# Patient Record
Sex: FEMALE | Race: WHITE | NOT HISPANIC OR LATINO | URBAN - METROPOLITAN AREA
[De-identification: names, ages, dates, MRNs, and addresses within clinical notes are randomized per-mention and may not be internally consistent; named-entity substitution may affect disease eponyms.]

---
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
## 2022-01-05 RX ORDER — METFORMIN HYDROCHLORIDE 500 MG/1
500 TABLET, COATED ORAL
Qty: 1 | Refills: 2 | Status: DISCONTINUED | COMMUNITY
Start: 2021-12-29 | End: 2022-01-05

## 2022-01-07 LAB — 17OHP SERPL-MCNC: 24 NG/DL

## 2022-03-28 ENCOUNTER — APPOINTMENT (OUTPATIENT)
Dept: ENDOCRINOLOGY | Facility: CLINIC | Age: 22
End: 2022-03-28

## 2022-04-08 ENCOUNTER — APPOINTMENT (OUTPATIENT)
Dept: ENDOCRINOLOGY | Facility: CLINIC | Age: 22
End: 2022-04-08
Payer: COMMERCIAL

## 2022-04-08 ENCOUNTER — APPOINTMENT (OUTPATIENT)
Dept: ENDOCRINOLOGY | Facility: CLINIC | Age: 22
End: 2022-04-08

## 2022-04-08 VITALS
DIASTOLIC BLOOD PRESSURE: 76 MMHG | SYSTOLIC BLOOD PRESSURE: 113 MMHG | OXYGEN SATURATION: 96 % | BODY MASS INDEX: 30.9 KG/M2 | WEIGHT: 181 LBS | HEART RATE: 100 BPM | HEIGHT: 64 IN

## 2022-04-08 DIAGNOSIS — L68.9 HYPERTRICHOSIS, UNSPECIFIED: ICD-10-CM

## 2022-04-08 LAB — GLUCOSE BLDC GLUCOMTR-MCNC: 84

## 2022-04-08 PROCEDURE — 82962 GLUCOSE BLOOD TEST: CPT

## 2022-04-08 PROCEDURE — 99213 OFFICE O/P EST LOW 20 MIN: CPT | Mod: 25

## 2022-04-08 PROCEDURE — 83036 HEMOGLOBIN GLYCOSYLATED A1C: CPT | Mod: QW

## 2022-04-11 PROBLEM — L68.9 EXCESSIVE HAIR GROWTH: Status: ACTIVE | Noted: 2021-12-27

## 2022-04-11 LAB — HBA1C MFR BLD HPLC: 5

## 2022-04-11 NOTE — ASSESSMENT
[Weight Loss] : weight loss [Diabetic Medications] : Risks and benefits of diabetic medications were discussed [FreeTextEntry1] : 1. 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\par Family h/o significant for TERESO in both mother and sisters (they have confirmed different variants to have TERESO type 1) and type 2 DM\par POC HgbA1c today is 5.0%, not in diabetic range, POC glucose is 84mg/dl today\par Discussed previously with patient regardingfeatures of TERESO 3 and is usually with sulfonylurea medication\par Advised her she has not developed diabetes as of yet and likely does not need treatment at this time, however her variant is designated as "variant of undetermined significance"\par Will consider to do oral glucose tolerance testing if HgbA1c rises at next visit.\par \par 2. Weight management, BMI today is 31\par h/o complaint of increased hair growth, weight gain indicative of PCOS clinical picture\par Clinical signs of hyperandrogenism noted on exam- hair on upper lip/chin,sideburns and acne on face\par Noted normal prolactin, TSH, 17-hydroxyprogesterone, normal total testosterone but noted mildly elevated free testosterone and elevated DHEAS. She has regular menstrual periods, not interested in pregnancy. Reminded to f/u OB/GYN for pelvic exam and if interested in OCPs.\par Patient likely has degree of insulin resistance, currently tolerating Metformin ER 500mg po BID\par Encouraged patient to continue with lifestyle modifications with diet and exercise.\par \par Answered all questions today; patient verbalized understanding of the above.\par RTC in 3 months.

## 2022-04-11 NOTE — HISTORY OF PRESENT ILLNESS
[FreeTextEntry1] : This is a 22 yo female with past medical history of ADHD, h/o positive variant in HNF1A gene, who presents for follow up \par \par At initial endo visit, it was noted patient did genetic testing and TERESO panel as one of her sisters was found to different variants, and TERESO-1. Patient herself has positive HNF1A variant associated with MODY3. At last visit, HgbA1c was noted 5.2%. She was not started on any medication. She also had complaint of hirsuitsm and weight gain, suspecting PCOS clinical picture. Bloodwork noted normal 17-hydroxyprogesterone, TSH and prolactin levels. She was noted to have milldy elevated insulin level, possible insulin resistance and started on low dose of ER Metformin 500mg po BID. LMP 3/7/22. \par \par Today, patient is doing well, notes she is tolerating metformin er 500mg po BID. She had COVID infection earlier this year, and notes she has made changes to her diet since then. She notes her sense of taste and smell have not fully returned to normal. She cut out meat.\par breakfast: yogurt w/ 1/2 can diet soda, buttered noodles\par lunch: tofu with rice and veggies. cut out bread\par dinner: tofu rice, veggie fruit dessert: apple or banana, almond milk\par drinking water, about 32 oz /day\par She denies polyuria, polydipsia. Notes she lost 14lbs in past 4 months.

## 2022-04-11 NOTE — PHYSICAL EXAM
[Alert] : alert [Well Nourished] : well nourished [Obese] : obese [No Acute Distress] : no acute distress [Well Developed] : well developed [Normal Sclera/Conjunctiva] : normal sclera/conjunctiva [EOMI] : extra ocular movement intact [No Proptosis] : no proptosis [No Lid Lag] : no lid lag [Normal Hearing] : hearing was normal [No LAD] : no lymphadenopathy [Supple] : the neck was supple [No Thyroid Nodules] : no palpable thyroid nodules [No Respiratory Distress] : no respiratory distress [No Accessory Muscle Use] : no accessory muscle use [Normal Rate and Effort] : normal respiratory rate and effort [Clear to Auscultation] : lungs were clear to auscultation bilaterally [Normal S1, S2] : normal S1 and S2 [No Murmurs] : no murmurs [Normal Rate] : heart rate was normal [Regular Rhythm] : with a regular rhythm [Normal Bowel Sounds] : normal bowel sounds [Not Distended] : not distended [Not Tender] : non-tender [Soft] : abdomen soft [No Stigmata of Cushings Syndrome] : no stigmata of Cushings Syndrome [Normal Gait] : normal gait [No Clubbing, Cyanosis] : no clubbing  or cyanosis of the fingernails [No Involuntary Movements] : no involuntary movements were seen [No Rash] : no rash [Acne] : acne present [Hirsutism] : hirsutism present [Normal Reflexes] : deep tendon reflexes were 2+ and symmetric [No Tremors] : no tremors [Oriented x3] : oriented to person, place, and time [Abdominal Striae] : no abdominal striae [Acanthosis Nigricans] : no acanthosis nigricans [de-identified] : thyromegaly ~30g

## 2022-06-01 ENCOUNTER — RX RENEWAL (OUTPATIENT)
Age: 22
End: 2022-06-01

## 2022-07-05 ENCOUNTER — APPOINTMENT (OUTPATIENT)
Dept: ENDOCRINOLOGY | Facility: CLINIC | Age: 22
End: 2022-07-05

## 2023-05-26 ENCOUNTER — APPOINTMENT (OUTPATIENT)
Dept: ENDOCRINOLOGY | Facility: CLINIC | Age: 23
End: 2023-05-26
Payer: COMMERCIAL

## 2023-05-26 VITALS
BODY MASS INDEX: 33.8 KG/M2 | SYSTOLIC BLOOD PRESSURE: 121 MMHG | HEIGHT: 64 IN | HEART RATE: 109 BPM | WEIGHT: 198 LBS | DIASTOLIC BLOOD PRESSURE: 81 MMHG | OXYGEN SATURATION: 97 %

## 2023-05-26 LAB
GLUCOSE BLDC GLUCOMTR-MCNC: 103
HBA1C MFR BLD HPLC: 5.3

## 2023-05-26 PROCEDURE — 83036 HEMOGLOBIN GLYCOSYLATED A1C: CPT | Mod: QW

## 2023-05-26 PROCEDURE — 82962 GLUCOSE BLOOD TEST: CPT

## 2023-05-26 PROCEDURE — 99213 OFFICE O/P EST LOW 20 MIN: CPT | Mod: 25

## 2023-05-30 NOTE — HISTORY OF PRESENT ILLNESS
[FreeTextEntry1] : This is a 21 yo female with past medical history of ADHD, h/o positive variant in HNF1A gene, who presents for follow up \par \par At initial endo visit, it was noted patient did genetic testing and TERESO panel as one of her sisters was found to different variants, and TERESO-1. Patient herself has positive HNF1A variant associated with MODY3. At last visit, HgbA1c was noted 5.2%. She was not started on any medication. She also had complaint of hirsutism and weight gain, suspecting PCOS clinical picture. Bloodwork noted normal 17-hydroxyprogesterone, TSH and prolactin levels. She was noted to have mildly elevated insulin level, possible insulin resistance and started on low dose of ER Metformin 500mg po BID. \par \par Today, patient is doing well, notes she is tolerating metformin er 500mg po BID. \par She denies polyuria, polydipsia. Notes she lost 14lbs in past 4 months.

## 2023-05-30 NOTE — PHYSICAL EXAM
[Alert] : alert [Well Nourished] : well nourished [Obese] : obese [No Acute Distress] : no acute distress [Well Developed] : well developed [Normal Sclera/Conjunctiva] : normal sclera/conjunctiva [EOMI] : extra ocular movement intact [No Proptosis] : no proptosis [No Lid Lag] : no lid lag [Normal Hearing] : hearing was normal [No LAD] : no lymphadenopathy [Supple] : the neck was supple [No Thyroid Nodules] : no palpable thyroid nodules [No Respiratory Distress] : no respiratory distress [No Accessory Muscle Use] : no accessory muscle use [Normal Rate and Effort] : normal respiratory rate and effort [Clear to Auscultation] : lungs were clear to auscultation bilaterally [Normal S1, S2] : normal S1 and S2 [No Murmurs] : no murmurs [Normal Rate] : heart rate was normal [Regular Rhythm] : with a regular rhythm [Normal Bowel Sounds] : normal bowel sounds [Not Tender] : non-tender [Not Distended] : not distended [Soft] : abdomen soft [No Stigmata of Cushings Syndrome] : no stigmata of Cushings Syndrome [Normal Gait] : normal gait [No Clubbing, Cyanosis] : no clubbing  or cyanosis of the fingernails [No Involuntary Movements] : no involuntary movements were seen [No Rash] : no rash [Acne] : acne present [Hirsutism] : hirsutism present [Normal Reflexes] : deep tendon reflexes were 2+ and symmetric [No Tremors] : no tremors [Oriented x3] : oriented to person, place, and time [Abdominal Striae] : no abdominal striae [Acanthosis Nigricans] : no acanthosis nigricans [de-identified] : thyromegaly ~30g

## 2023-05-30 NOTE — ASSESSMENT
[Weight Loss] : weight loss [Diabetic Medications] : Risks and benefits of diabetic medications were discussed [FreeTextEntry1] : 1. 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\par Family h/o significant for TERESO in both mother and sisters (they have confirmed different variants to have TERESO type 1) and type 2 DM\par POC HgbA1c today is 5.3%, not in diabetic range\par Discussed previously with patient regardingfeatures of TERESO 3 and is usually with sulfonylurea medication\par Advised her she has not developed diabetes as of yet and likely does not need treatment at this time, however her variant is designated as "variant of undetermined significance"\par Discussed indications, benefits and potential side effects of GLP=1 agonists; pt agreed to try\par Start Ozempic 0.25mg weekly, if tolerating after 1 month, can increase dose to 0.5mg weekly\par \par 2. Weight management, BMI today is 33\par h/o complaint of increased hair growth, weight gain indicative of PCOS clinical picture\par Continue Metformin ER 500mg po BID\par Encouraged patient to continue with lifestyle modifications with diet and exercise.\par \par Answered all questions today; patient verbalized understanding of the above.\par RTC in 6 months.

## 2023-11-28 ENCOUNTER — APPOINTMENT (OUTPATIENT)
Dept: ENDOCRINOLOGY | Facility: CLINIC | Age: 23
End: 2023-11-28
Payer: COMMERCIAL

## 2023-11-28 VITALS
DIASTOLIC BLOOD PRESSURE: 98 MMHG | TEMPERATURE: 97.3 F | WEIGHT: 183 LBS | OXYGEN SATURATION: 99 % | BODY MASS INDEX: 31.24 KG/M2 | SYSTOLIC BLOOD PRESSURE: 107 MMHG | HEIGHT: 64 IN | HEART RATE: 98 BPM

## 2023-11-28 DIAGNOSIS — E28.2 POLYCYSTIC OVARIAN SYNDROME: ICD-10-CM

## 2023-11-28 PROCEDURE — 99213 OFFICE O/P EST LOW 20 MIN: CPT

## 2023-12-01 PROBLEM — E28.2 PCOS (POLYCYSTIC OVARIAN SYNDROME): Status: ACTIVE | Noted: 2021-12-29

## 2024-05-28 ENCOUNTER — RX RENEWAL (OUTPATIENT)
Age: 24
End: 2024-05-28

## 2024-05-30 ENCOUNTER — APPOINTMENT (OUTPATIENT)
Dept: ENDOCRINOLOGY | Facility: CLINIC | Age: 24
End: 2024-05-30
Payer: COMMERCIAL

## 2024-05-30 VITALS
HEART RATE: 91 BPM | OXYGEN SATURATION: 99 % | BODY MASS INDEX: 30.22 KG/M2 | HEIGHT: 64 IN | WEIGHT: 177 LBS | DIASTOLIC BLOOD PRESSURE: 84 MMHG | SYSTOLIC BLOOD PRESSURE: 131 MMHG

## 2024-05-30 DIAGNOSIS — E16.1 OTHER HYPOGLYCEMIA: ICD-10-CM

## 2024-05-30 DIAGNOSIS — E11.9 TYPE 2 DIABETES MELLITUS W/OUT COMPLICATIONS: ICD-10-CM

## 2024-05-30 DIAGNOSIS — E66.9 OBESITY, UNSPECIFIED: ICD-10-CM

## 2024-05-30 PROCEDURE — 99214 OFFICE O/P EST MOD 30 MIN: CPT

## 2024-05-30 RX ORDER — METFORMIN ER 500 MG 500 MG/1
500 TABLET ORAL
Qty: 1 | Refills: 1 | Status: ACTIVE | COMMUNITY
Start: 2022-01-05 | End: 1900-01-01

## 2024-05-30 RX ORDER — SEMAGLUTIDE 1.34 MG/ML
4 INJECTION, SOLUTION SUBCUTANEOUS
Qty: 3 | Refills: 2 | Status: ACTIVE | COMMUNITY
Start: 2023-05-26 | End: 1900-01-01

## 2024-05-31 NOTE — HISTORY OF PRESENT ILLNESS
[FreeTextEntry1] : This is a 22 yo female with past medical history of ADHD, h/o positive variant in HNF1A gene, who presents for diabetes follow up   At initial endo visit, it was noted patient did genetic testing and TERESO panel as one of her sisters was found to different variants, and TERESO-1. Patient herself has positive HNF1A variant associated with MODY3.  At last endocrine visit in May 2023, HgbA1c was noted 5.3%, did not want sulfonulyurea, and started on Ozempic for weight loss as well. She is doing well with Ozempic, has lost over 10lbs since last visit She also had workup for PCOS in past; bloodwork noted normal 17-hydroxyprogesterone, TSH and prolactin levels. She was noted to have mildly elevated insulin level, possible insulin resistance and started on low dose of ER Metformin 500mg po BID.   Today, patient is doing well, notes she is tolerating metformin er 500mg po BID.  She denies polyuria, polydipsia. Notes she lost 14lbs in past 4 months.

## 2024-05-31 NOTE — PHYSICAL EXAM
[Alert] : alert [No Acute Distress] : no acute distress [Well Developed] : well developed [Normal Voice/Communication] : normal voice communication [EOMI] : extra ocular movement intact [PERRL] : pupils equal, round and reactive to light [No Proptosis] : no proptosis [No Lid Lag] : no lid lag [Supple] : the neck was supple [Thyroid Not Enlarged] : the thyroid was not enlarged [No Thyroid Nodules] : no palpable thyroid nodules [No Respiratory Distress] : no respiratory distress [No Accessory Muscle Use] : no accessory muscle use [Normal Rate and Effort] : normal respiratory rate and effort [Normal Bowel Sounds] : normal bowel sounds [Not Tender] : non-tender [Normal Gait] : normal gait [No Involuntary Movements] : no involuntary movements were seen [Right foot was examined, including] : right foot ~C was examined, including visual inspection with sensory and pulse exams [Left foot was examined, including] : left foot ~C was examined, including visual inspection with sensory and pulse exams [Normal] : normal [No Tremors] : no tremors [Normal Sensation on Monofilament Testing] : normal sensation on monofilament testing of lower extremities [Oriented x3] : oriented to person, place, and time [Normal Insight/Judgement] : insight and judgment were intact [Foot Ulcers] : no foot ulcers [Delayed in the Right Toes] : normal in the toes [Delayed in the Left Toes] : normal in the toes [Diminished Throughout Both Feet] : normal tactile sensation with monofilament testing throughout both feet

## 2024-05-31 NOTE — ASSESSMENT
[Diabetes Foot Care] : diabetes foot care [Long Term Vascular Complications] : long term vascular complications of diabetes [Carbohydrate Consistent Diet] : carbohydrate consistent diet [Importance of Diet and Exercise] : importance of diet and exercise to improve glycemic control, achieve weight loss and improve cardiovascular health [Hypoglycemia Management] : hypoglycemia management [Self Monitoring of Blood Glucose] : self monitoring of blood glucose [Injection Technique, Storage, Sharps Disposal] : injection technique, storage, and sharps disposal [Retinopathy Screening] : Patient was referred to ophthalmology for retinopathy screening [Weight Loss] : weight loss [Diabetic Medications] : Risks and benefits of diabetic medications were discussed [FreeTextEntry1] : 1. 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 Weight management, BMI today is 30 h/o complaint of increased hair growth, weight gain indicative of PCOS clinical picture Patient does not want to start OCPs or spironolactone, prefers to continue with cosmetic hair removal methods Continue Metformin ER 500mg po BID Encouraged patient to continue with lifestyle modifications with diet and exercise.  Answered all questions today; patient verbalized understanding of the above. RTO in 6 months.

## 2025-05-12 ENCOUNTER — APPOINTMENT (OUTPATIENT)
Dept: ENDOCRINOLOGY | Facility: CLINIC | Age: 25
End: 2025-05-12
Payer: COMMERCIAL

## 2025-05-12 VITALS
OXYGEN SATURATION: 99 % | HEIGHT: 64 IN | SYSTOLIC BLOOD PRESSURE: 124 MMHG | WEIGHT: 172.5 LBS | DIASTOLIC BLOOD PRESSURE: 80 MMHG | BODY MASS INDEX: 29.45 KG/M2 | HEART RATE: 107 BPM

## 2025-05-12 DIAGNOSIS — E28.2 POLYCYSTIC OVARIAN SYNDROME: ICD-10-CM

## 2025-05-12 DIAGNOSIS — E11.9 TYPE 2 DIABETES MELLITUS W/OUT COMPLICATIONS: ICD-10-CM

## 2025-05-12 PROCEDURE — 99214 OFFICE O/P EST MOD 30 MIN: CPT

## 2025-05-12 RX ORDER — SEMAGLUTIDE 1.34 MG/ML
4 INJECTION, SOLUTION SUBCUTANEOUS
Qty: 1 | Refills: 3 | Status: ACTIVE | COMMUNITY
Start: 2025-05-12 | End: 1900-01-01